# Patient Record
Sex: FEMALE | NOT HISPANIC OR LATINO | URBAN - METROPOLITAN AREA
[De-identification: names, ages, dates, MRNs, and addresses within clinical notes are randomized per-mention and may not be internally consistent; named-entity substitution may affect disease eponyms.]

---

## 2017-10-17 ENCOUNTER — IMPORTED ENCOUNTER (OUTPATIENT)
Dept: URBAN - METROPOLITAN AREA CLINIC 1 | Facility: CLINIC | Age: 72
End: 2017-10-17

## 2017-10-17 PROBLEM — H43.812: Noted: 2017-10-17

## 2017-10-17 PROBLEM — H25.813: Noted: 2017-10-17

## 2017-10-17 PROBLEM — Z79.84: Noted: 2017-10-17

## 2017-10-17 PROBLEM — H02.831: Noted: 2017-10-17

## 2017-10-17 PROBLEM — H02.834: Noted: 2017-10-17

## 2017-10-17 PROBLEM — E11.9: Noted: 2017-10-17

## 2017-10-17 PROCEDURE — 92014 COMPRE OPH EXAM EST PT 1/>: CPT

## 2017-10-17 PROCEDURE — 92015 DETERMINE REFRACTIVE STATE: CPT

## 2017-10-17 NOTE — PATIENT DISCUSSION
1.  DM Type II (Oral Meds) without sign of diabetic retinopathy and no blot heme on dilated retinal examination today OU No Macular Edema:  Discussed the pathophysiology of diabetes and its effect on the eye and risk of blindness. Stressed the importance of strong glucose control. Advised of importance of at least yearly dilated examinations but to contact us immediately for any problems or concerns. 2. PVD w/o Tear OS- RD precautions. 3.  Dermatochalasis OU UL's  - Follow with no intervention at this time. 4. Cataract OU:  Visually Significant secondary to glare discussed the risks benefits alternatives and limitations of cataract surgery. The patient stated a full understanding and a desire to proceed with the procedure. The patient will need to return for preop appointment with cataract measurements and to have any additional questions answered and start pre-operative eye drops as directed. Phaco PCL OS then OD. (Otherwise follow-up 6 mo 10 glare) Letter to Raji Elizalde for an appointment with Dr. Claudeen Cobble for AS/HP.

## 2018-03-27 ENCOUNTER — IMPORTED ENCOUNTER (OUTPATIENT)
Dept: URBAN - METROPOLITAN AREA CLINIC 1 | Facility: CLINIC | Age: 73
End: 2018-03-27

## 2018-03-27 PROBLEM — H25.813: Noted: 2018-03-27

## 2018-03-27 PROCEDURE — 92015 DETERMINE REFRACTIVE STATE: CPT

## 2018-03-27 PROCEDURE — 92012 INTRM OPH EXAM EST PATIENT: CPT

## 2018-03-27 NOTE — PATIENT DISCUSSION
1. Cataract OU:  Visually Significant secondary to glare discussed the risks benefits alternatives and limitations of cataract surgery. The patient stated a full understanding and a desire to proceed with the procedure. The patient will need to return for preop appointment with cataract measurements and to have any additional questions answered and start pre-operative eye drops as directed. Phaco PCL OS then OD. (Otherwise follow-up 6 mo 30 glare) 2. DM Type II (Oral Meds) without sign of diabetic retinopathy and no blot heme on dilated retinal examination today OU No Macular Edema:  Discussed the pathophysiology of diabetes and its effect on the eye and risk of blindness. Stressed the importance of strong glucose control. Advised of importance of at least yearly dilated examinations but to contact us immediately for any problems or concerns. 3. PVD w/o Tear OS- RD precautions. 4.  Dermatochalasis OU UL's  - Follow with no intervention at this time. Return for an appointment with Dr. Bora Wilson for AS/HP.

## 2018-05-01 ENCOUNTER — IMPORTED ENCOUNTER (OUTPATIENT)
Dept: URBAN - METROPOLITAN AREA CLINIC 1 | Facility: CLINIC | Age: 73
End: 2018-05-01

## 2018-05-01 PROBLEM — H25.812: Noted: 2018-05-01

## 2018-05-01 PROCEDURE — 92136 OPHTHALMIC BIOMETRY: CPT

## 2018-05-01 NOTE — PATIENT DISCUSSION
Cataract OS: Visually Significant secondary to glare discussed the risks benefits alternatives and limitations of cataract surgery. The patient stated a full understanding and a desire to proceed with the procedure. The patient will need to start pre-operative eye drops as directed. Proceed w/ phaco PCL OS w/ Symfony MF Pt understands they will need glasses post-op to achieve their best corrected vision. Return for an appointment for sx OS with Dr. Maryam Lazcano.

## 2018-05-09 ENCOUNTER — IMPORTED ENCOUNTER (OUTPATIENT)
Dept: URBAN - METROPOLITAN AREA CLINIC 1 | Facility: CLINIC | Age: 73
End: 2018-05-09

## 2018-05-10 ENCOUNTER — IMPORTED ENCOUNTER (OUTPATIENT)
Dept: URBAN - METROPOLITAN AREA CLINIC 1 | Facility: CLINIC | Age: 73
End: 2018-05-10

## 2018-05-10 PROBLEM — Z96.1: Noted: 2018-05-10

## 2018-05-10 PROCEDURE — 99024 POSTOP FOLLOW-UP VISIT: CPT

## 2018-05-10 NOTE — PATIENT DISCUSSION
POD#1 CE/IOL OS (Symfony MF- ZXROO)  doing well. Continue all 3 gtts as prescribed and until gone. Use Durezol BID OS Ilevro Qdaily OS Ocuflox TID OS Post op Warnings Reiterated RTC as scheduled

## 2018-05-15 ENCOUNTER — IMPORTED ENCOUNTER (OUTPATIENT)
Dept: URBAN - METROPOLITAN AREA CLINIC 1 | Facility: CLINIC | Age: 73
End: 2018-05-15

## 2018-05-15 PROBLEM — Z96.1: Noted: 2018-05-15

## 2018-05-15 PROBLEM — H25.811: Noted: 2018-05-15

## 2018-05-15 PROCEDURE — 92136 OPHTHALMIC BIOMETRY: CPT

## 2018-05-15 NOTE — PATIENT DISCUSSION
1.  Cataract OD: Visually Significant secondary to glare discussed the risks benefits alternatives and limitations of cataract surgery. The patient stated a full understanding and a desire to proceed with the procedure. The patient will need to start pre-operative eye drops as directed. Proceed w/ phaco PCL OD Discussed with patient and patient understands halos around lights and glare are expected post-op particularly at night with the MF lens choice. Pt understood. 2.  POW#1  CE/IOL Symfony MF ZXROO OS doing well. Discontinue OcufloxContinue Lotemax/Durezol/Prednisolone BID until gone. Continue Prolensa/Ilevro/Acular QD until gone. 3. Return for an appointment for sx OD with Dr. Madhuri Ceron.

## 2018-05-23 ENCOUNTER — IMPORTED ENCOUNTER (OUTPATIENT)
Dept: URBAN - METROPOLITAN AREA CLINIC 1 | Facility: CLINIC | Age: 73
End: 2018-05-23

## 2018-05-24 ENCOUNTER — IMPORTED ENCOUNTER (OUTPATIENT)
Dept: URBAN - METROPOLITAN AREA CLINIC 1 | Facility: CLINIC | Age: 73
End: 2018-05-24

## 2018-05-24 PROBLEM — Z96.1: Noted: 2018-05-24

## 2018-05-24 PROCEDURE — 99024 POSTOP FOLLOW-UP VISIT: CPT

## 2018-06-18 ENCOUNTER — IMPORTED ENCOUNTER (OUTPATIENT)
Dept: URBAN - METROPOLITAN AREA CLINIC 1 | Facility: CLINIC | Age: 73
End: 2018-06-18

## 2018-06-18 PROBLEM — Z96.1: Noted: 2018-06-18

## 2018-06-18 PROCEDURE — 99024 POSTOP FOLLOW-UP VISIT: CPT

## 2018-06-18 NOTE — PATIENT DISCUSSION
1. POM#1 CE/IOL OU doing well. Continue Durezol BID until gone. Continue Ilevro QD until gone. 2. Return for an appointment for October/30 with Dr. Ilene Platt.

## 2018-10-12 ENCOUNTER — IMPORTED ENCOUNTER (OUTPATIENT)
Dept: URBAN - METROPOLITAN AREA CLINIC 1 | Facility: CLINIC | Age: 73
End: 2018-10-12

## 2018-10-12 PROBLEM — E11.9: Noted: 2018-10-12

## 2018-10-12 PROBLEM — H43.812: Noted: 2018-10-12

## 2018-10-12 PROBLEM — Z96.1: Noted: 2018-10-12

## 2018-10-12 PROBLEM — H02.831: Noted: 2018-10-12

## 2018-10-12 PROBLEM — H02.834: Noted: 2018-10-12

## 2018-10-12 PROBLEM — H26.493: Noted: 2018-10-12

## 2018-10-12 PROBLEM — Z79.84: Noted: 2018-10-12

## 2018-10-12 PROCEDURE — 92014 COMPRE OPH EXAM EST PT 1/>: CPT

## 2018-10-12 NOTE — PATIENT DISCUSSION
1.  DM Type II (Oral Meds) -- Without sign of diabetic retinopathy and no blot heme on dilated retinal examination today OU and no Macular Edema OU: Discussed the pathophysiology of diabetes and its effect on the eye and risk of blindness. Stressed the importance of strong glucose control. Advised of importance of at least yearly dilated examinations but to contact us immediately for any problems or concerns. 2. PCO OU (Posterior Capsule Opacification) -- Observe and consider YAG Cap when pt feels PCO visually significant and visual acuity decreases to appropriate level. 3. PVD w/o Tear OS -- Old Stable. RD precautions given. 4. Dermatochalasis OU UL's -- Follow with no intervention at this time. 5. Pseudophakia OU (Symfony MF OU) -- Doing well. Letter to PCP. Return for an appointment in 1 YR for a 30 OU with Dr. Micheal Méndez.

## 2019-11-27 ENCOUNTER — IMPORTED ENCOUNTER (OUTPATIENT)
Dept: URBAN - METROPOLITAN AREA CLINIC 1 | Facility: CLINIC | Age: 74
End: 2019-11-27

## 2019-11-27 PROBLEM — H26.493: Noted: 2019-11-27

## 2019-11-27 PROBLEM — R73.09: Noted: 2019-11-27

## 2019-11-27 PROCEDURE — 92014 COMPRE OPH EXAM EST PT 1/>: CPT

## 2019-11-27 NOTE — PATIENT DISCUSSION
1.  DM Type II (Diet Controlled) without sign of diabetic retinopathy and no blot heme on dilated retinal examination today OU No Macular Edema:  Discussed the pathophysiology of diabetes and its effect on the eye and risk of blindness. Stressed the importance of strong glucose control. Advised of importance of at least yearly dilated examinations but to contact us immediately for any problems or concerns. 2. PCO OU- Visually Significant secondary to glare; schedule YAG Cap OD then OS. Pros cons risks and benefits. 3.  PVD w/o Tear OS -- Stable. RD precautions given. 4. Dermatochalasis OU UL's -- observe 5.   Pseudophakia OU (Symfony MF ZXROO OU) Schedule YAG Cap OD then OS

## 2019-12-06 ENCOUNTER — IMPORTED ENCOUNTER (OUTPATIENT)
Dept: URBAN - METROPOLITAN AREA CLINIC 1 | Facility: CLINIC | Age: 74
End: 2019-12-06

## 2019-12-06 PROBLEM — H26.491: Noted: 2019-12-06

## 2019-12-06 PROCEDURE — 66821 AFTER CATARACT LASER SURGERY: CPT

## 2019-12-20 ENCOUNTER — IMPORTED ENCOUNTER (OUTPATIENT)
Dept: URBAN - METROPOLITAN AREA CLINIC 1 | Facility: CLINIC | Age: 74
End: 2019-12-20

## 2019-12-20 PROBLEM — H26.492: Noted: 2019-12-20

## 2019-12-20 PROCEDURE — 66821 AFTER CATARACT LASER SURGERY: CPT

## 2019-12-20 NOTE — PATIENT DISCUSSION
YAG CAP OS: (Consent signed and scanned into attachments) 1 gtt Prolensa applied. The purpose and nature of the procedure possible alternative methods of treatment the risks involved and the possibility of complications were discussed with patient. The Patient wishes to proceed and the consent was signed. The laser was then performed under topical anesthesia with no complications. Post op instructions were given to patient as well as a follow-up appointment. Patient was advised to call our office if any questions or concerns. Return for an appointment in 1-4 weeks po/yag with Dr. Nina Eisenberg.

## 2020-02-10 ENCOUNTER — IMPORTED ENCOUNTER (OUTPATIENT)
Dept: URBAN - METROPOLITAN AREA CLINIC 1 | Facility: CLINIC | Age: 75
End: 2020-02-10

## 2020-02-10 PROCEDURE — 99024 POSTOP FOLLOW-UP VISIT: CPT

## 2020-02-10 NOTE — PATIENT DISCUSSION
PO YAG Cap OU: good result. MRX given. Return for an appointment in November for a 27 with Dr. Madhu Tolbert.

## 2020-11-10 ENCOUNTER — IMPORTED ENCOUNTER (OUTPATIENT)
Dept: URBAN - METROPOLITAN AREA CLINIC 1 | Facility: CLINIC | Age: 75
End: 2020-11-10

## 2020-11-10 PROBLEM — H02.831: Noted: 2020-11-10

## 2020-11-10 PROBLEM — R73.09: Noted: 2020-11-10

## 2020-11-10 PROBLEM — H02.834: Noted: 2020-11-10

## 2020-11-10 PROBLEM — H43.812: Noted: 2020-11-10

## 2020-11-10 PROBLEM — Z96.1: Noted: 2020-11-10

## 2020-11-10 PROCEDURE — 92014 COMPRE OPH EXAM EST PT 1/>: CPT

## 2020-11-10 NOTE — PATIENT DISCUSSION
1.  DM Type II (Diet Controlled) without sign of diabetic retinopathy and no blot heme on dilated retinal examination today OU No Macular Edema:  Discussed the pathophysiology of diabetes and its effect on the eye and risk of blindness. Stressed the importance of strong glucose control. Advised of importance of at least yearly dilated examinations but to contact us immediately for any problems or concerns. 2. Pseudophakia OU - (Symfony MF ZXROO OU)  h/o YAG Cap OU 3. Dermatochalasis OU UL's  - Follow with no intervention at this time. 4. PVD w/o Tear OS- RD precautions. Patient defers the refraction at today's visitReturn for an appointment in 1 year 27 with Dr. Alivia Bangura.

## 2021-11-08 ENCOUNTER — IMPORTED ENCOUNTER (OUTPATIENT)
Dept: URBAN - METROPOLITAN AREA CLINIC 1 | Facility: CLINIC | Age: 76
End: 2021-11-08

## 2021-11-08 PROBLEM — H02.834: Noted: 2021-11-08

## 2021-11-08 PROBLEM — R73.09: Noted: 2021-11-08

## 2021-11-08 PROBLEM — H35.373: Noted: 2021-11-08

## 2021-11-08 PROBLEM — H02.831: Noted: 2021-11-08

## 2021-11-08 PROBLEM — H35.371: Noted: 2021-11-08

## 2021-11-08 PROBLEM — Z96.1: Noted: 2021-11-08

## 2021-11-08 PROCEDURE — 99214 OFFICE O/P EST MOD 30 MIN: CPT

## 2021-11-08 NOTE — PATIENT DISCUSSION
1.  DM Type II (Diet Controlled) -- without sign of diabetic retinopathy and no blot heme on dilated retinal examination today OU No Macular Edema:  Discussed the pathophysiology of diabetes and its effect on the eye and risk of blindness. Stressed the importance of strong glucose control. Advised of importance of at least yearly dilated examinations but to contact us immediately for any problems or concerns. 2. Epiretinal Membrane OD *New  Observe for change. 3. Dermatochalasis OU UL's  - Follow with no intervention at this time. 4. Pseudophakia OU - (Symfony MF ZXROO OU)  h/o YAG Cap OU 5. PVD w/o Tear OS -- RD precautions. Patient deferred Manifest Rx today. Return for an appointment in 1 year 27 with Dr. El Peña.

## 2022-04-02 ASSESSMENT — TONOMETRY
OD_IOP_MMHG: 16
OD_IOP_MMHG: 14
OS_IOP_MMHG: 14
OD_IOP_MMHG: 16
OS_IOP_MMHG: 15
OD_IOP_MMHG: 14
OD_IOP_MMHG: 15
OS_IOP_MMHG: 16
OS_IOP_MMHG: 15
OD_IOP_MMHG: 15
OD_IOP_MMHG: 16
OS_IOP_MMHG: 15
OD_IOP_MMHG: 12
OS_IOP_MMHG: 16
OS_IOP_MMHG: 12
OS_IOP_MMHG: 16
OS_IOP_MMHG: 15
OD_IOP_MMHG: 15
OS_IOP_MMHG: 16
OS_IOP_MMHG: 16

## 2022-04-02 ASSESSMENT — VISUAL ACUITY
OD_CC: 20/20
OS_SC: J1
OS_SC: 20/20
OS_CC: 20/20+2
OD_SC: J1
OD_GLARE: 20/100
OS_CC: 20/20-1
OS_SC: J1
OS_CC: 20/20-2
OS_CC: 20/20
OD_CC: 20/20
OD_CC: 20/20
OD_SC: 20/20
OD_CC: 20/20
OD_SC: J1
OD_CC: J1+
OD_CC: 20/20-1
OS_CC: 20/20
OD_GLARE: 20/100
OD_CC: 20/20-1
OD_SC: J1
OS_CC: 20/20
OU_SC: J1
OS_SC: J1
OD_SC: 20/20
OD_GLARE: 20/50
OS_CC: 20/20-1
OS_GLARE: 20/50
OD_CC: J1
OD_CC: 20/20
OS_CC: 20/20-2
OS_GLARE: 20/100
OS_CC: J1
OS_SC: 20/20
OS_CC: 20/20-1
OS_GLARE: 20/100
OS_CC: J1+

## 2022-11-08 ENCOUNTER — COMPREHENSIVE EXAM (OUTPATIENT)
Dept: URBAN - METROPOLITAN AREA CLINIC 1 | Facility: CLINIC | Age: 77
End: 2022-11-08

## 2022-11-08 DIAGNOSIS — E11.9: ICD-10-CM

## 2022-11-08 DIAGNOSIS — H02.834: ICD-10-CM

## 2022-11-08 DIAGNOSIS — H43.812: ICD-10-CM

## 2022-11-08 DIAGNOSIS — H02.831: ICD-10-CM

## 2022-11-08 DIAGNOSIS — H35.371: ICD-10-CM

## 2022-11-08 DIAGNOSIS — Z96.1: ICD-10-CM

## 2022-11-08 PROCEDURE — 99214 OFFICE O/P EST MOD 30 MIN: CPT

## 2022-11-08 ASSESSMENT — TONOMETRY
OD_IOP_MMHG: 15
OS_IOP_MMHG: 15

## 2022-11-08 ASSESSMENT — VISUAL ACUITY
OS_SC: 20/20
OD_SC: 20/25
OU_SC: J1

## 2023-11-13 ENCOUNTER — COMPREHENSIVE EXAM (OUTPATIENT)
Dept: URBAN - METROPOLITAN AREA CLINIC 1 | Facility: CLINIC | Age: 78
End: 2023-11-13

## 2023-11-13 DIAGNOSIS — H35.371: ICD-10-CM

## 2023-11-13 DIAGNOSIS — H43.812: ICD-10-CM

## 2023-11-13 DIAGNOSIS — H02.834: ICD-10-CM

## 2023-11-13 DIAGNOSIS — H02.831: ICD-10-CM

## 2023-11-13 DIAGNOSIS — Z96.1: ICD-10-CM

## 2023-11-13 PROCEDURE — 99214 OFFICE O/P EST MOD 30 MIN: CPT

## 2023-11-13 ASSESSMENT — TONOMETRY
OS_IOP_MMHG: 14
OD_IOP_MMHG: 14

## 2023-11-13 ASSESSMENT — VISUAL ACUITY
OD_SC: J2-2
OD_SC: 20/25-2
OS_SC: J1-2
OS_SC: 20/20-2

## 2024-11-18 ENCOUNTER — COMPREHENSIVE EXAM (OUTPATIENT)
Dept: URBAN - METROPOLITAN AREA CLINIC 1 | Facility: CLINIC | Age: 79
End: 2024-11-18

## 2024-11-18 DIAGNOSIS — H35.373: ICD-10-CM

## 2024-11-18 DIAGNOSIS — H16.143: ICD-10-CM

## 2024-11-18 DIAGNOSIS — H04.123: ICD-10-CM

## 2024-11-18 PROCEDURE — 99214 OFFICE O/P EST MOD 30 MIN: CPT
